# Patient Record
Sex: MALE | Race: WHITE
[De-identification: names, ages, dates, MRNs, and addresses within clinical notes are randomized per-mention and may not be internally consistent; named-entity substitution may affect disease eponyms.]

---

## 2019-02-27 ENCOUNTER — HOSPITAL ENCOUNTER (EMERGENCY)
Dept: HOSPITAL 11 - JP.ED | Age: 54
Discharge: HOME | End: 2019-02-27
Payer: MEDICAID

## 2019-02-27 VITALS — SYSTOLIC BLOOD PRESSURE: 149 MMHG | DIASTOLIC BLOOD PRESSURE: 73 MMHG

## 2019-02-27 DIAGNOSIS — K21.9: ICD-10-CM

## 2019-02-27 DIAGNOSIS — Z88.5: ICD-10-CM
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
## 2019-02-27 DIAGNOSIS — Z88.1: ICD-10-CM

## 2019-02-27 DIAGNOSIS — S82.842A: Primary | ICD-10-CM

## 2019-02-27 PROCEDURE — 73600 X-RAY EXAM OF ANKLE: CPT

## 2019-02-27 PROCEDURE — 27810 TREATMENT OF ANKLE FRACTURE: CPT

## 2019-02-27 PROCEDURE — 99152 MOD SED SAME PHYS/QHP 5/>YRS: CPT

## 2019-02-27 PROCEDURE — 73610 X-RAY EXAM OF ANKLE: CPT

## 2019-02-27 PROCEDURE — 99283 EMERGENCY DEPT VISIT LOW MDM: CPT

## 2019-02-27 NOTE — CRLCR
Indication:



Fracture post reduction



Technique:



Left ankle 3 views



Comparison:



February 27, 2019



Findings/Impression:



The ankle has been placed in a cast. Trimalleolar fracture fragments are in 

satisfactory alignment. Ankle mortise is congruent. No new abnormality



Dictated by Frankie Diaz MD @ 2/27/2019 6:31:28 PM



Dictated by: Frankie Diaz MD @ 02/27/2019 18:31:33



(Electronically Signed)

## 2019-02-27 NOTE — PCM.CONS
H&P History of Present Illness





- General


Date of Service: 02/27/19


Source of Information: Patient


History Limitations: Reports: No Limitations





- History of Present Illness


Initial Comments - Free Text/Narative: 





53 year old male presents to the ED with obvious deformity of the left ankle 

after a slip and fall on the ice. X-rays show a markedly displaced bimalleolar 

fracture. No other injuries.


Patient reports having last drink, beer, at approximately 3:30 PM.


Onset of Symptoms: Reports: Today, Sudden


Location: Reports: Lower Extremity, Left


Quality: Reports: Sharp


Severity: Severe


Associated Symptoms: Reports: No Other Symptoms


  ** Left Ankle


Pain Score (Numeric/FACES): 6





- Related Data


Allergies/Adverse Reactions: 


 Allergies











Allergy/AdvReac Type Severity Reaction Status Date / Time


 


azithromycin Allergy  Itching Verified 05/22/18 09:18


 


hydrocodone Allergy  Itching Verified 05/22/18 09:18


 


oxycodone Allergy  Itching Verified 05/22/18 09:18











Home Medications: 


 Home Meds





NK [No Known Home Meds]  12/29/15 [History]











Past Medical History


HEENT History: Reports: Hard of Hearing, Impaired Vision


Other HEENT History: wears contacts


Gastrointestinal History: Reports: GERD


Musculoskeletal History: Reports: Back Pain, Chronic





- Infectious Disease History


Infectious Disease History: Reports: Chicken Pox





- Past Surgical History


GI Surgical History: Reports: None


Musculoskeletal Surgical History: Reports: Other (See Below)





Social & Family History





- Family History


Family Medical History: Noncontributory





- Tobacco Use


Smoking Status *Q: Never Smoker





- Caffeine Use


Caffeine Use: Reports: Coffee, Soda





- Recreational Drug Use


Recreational Drug Use: No





H&P Review of Systems





- Review of Systems:


Review Of Systems: ROS reveals no pertinent complaints other than HPI.





Exam





- Exam


Exam: See Below





- Vital Signs


Vital Signs: 


 Last Vital Signs











Temp  35.7 C   02/27/19 17:30


 


Pulse  95   02/27/19 17:30


 


Resp  16   02/27/19 17:30


 


BP  149/73 H  02/27/19 17:30


 


Pulse Ox  94 L  02/27/19 17:30











Weight: 108.862 kg





- Exam


General: Alert, Oriented, 4


HEENT: PERRLA, Hearing Intact, Mucosa Moist & Pink, Nares Patent, Normal Nasal 

Septum, Posterior Pharynx Clear, Conjunctiva Clear, EOMI, EACs Clear, TMs Clear


Extremities: Other (Left ankle laterally displaced, toes cool but with good 

capillary refill, weak dorsalis pulse, skin intact)


Peripheral Pulses: 1+: Dorsalis Pedis (L), 2+: Dorsalis Pedis (R)


Neurological: Cranial Nerves Intact, Reflexes Equal Bilateral


Neuro Extensive - Mental Status: Alert, Oriented x3, Normal Mood/Affect, Normal 

Cognition


Neuro Extensive - Motor, Sensory, Reflexes: CN II-XII Intact, Normal Gait, 

Normal Reflexes


Psychiatric: Alert, Normal Affect, Normal Mood





Consult PN Assessment/Plan


Procedures: 


Procedures





DIAGNOSTIC COLONOSCOPY (12/31/15)


HOT OR COLD PACKS THERAPY (10/03/13)


MRI LUMBAR SPINE W/O & W/DYE (05/22/18)


OT EVALUATION (12/08/16)


THERAPEUTIC ACTIVITIES (12/08/16)


THERAPEUTIC EXERCISES (10/03/13)








(1) Bimalleolar fracture of left ankle


SNOMED Code(s): 289619267


   Code(s): S82.842A - DISPLACED BIMALLEOLAR FRACTURE OF LEFT LOWER LEG, INIT   

Current Visit: Yes   


Qualifiers: 


   Encounter type: initial encounter   Fracture type: closed   Qualified Code(s)

: S82.842A - Displaced bimalleolar fracture of left lower leg, initial 

encounter for closed fracture   


Assessment:: 


Displaced bimalleolar fracture with comminuted fibula.





Problem List Initiated/Reviewed/Updated: Yes


Plan: 


Left ankle reduced in ED with sedation provided by Dr. Loaiza.  Splint 

applied and post-reduction films obtained.  Good alignment post-reduction.


Will need surgical fixation.  Due to PO intake and alcohol would not be able to 

take to OR until after 11:30 PM. Can discharge to home.  Will arrange for 

surgery and be in contact with patient in the morning.  Instructed to keep foot 

elevated as much as possible and can use ice to minimize swelling.





Reason for Consult: Displaced fracture left ankle


Patient History Reviewed: Yes


Time Spent (in minutes): 30

## 2019-02-27 NOTE — CRLCR
Indication:



Injury 



Technique:



Three views left ankle



Comparison:



 None 



Findings/impression:



Highly comminuted fracture of the distal fibula. At least 2 cm of medial 

subluxation of the tibia in relation to the talus with obliquely oriented, 

minimally comminuted fracture of the medial malleolus. The talar dome 

appears intact. Diffuse soft tissue swelling around the ankle joint.



Dictated by Nidia Self MD @ Feb 27 2019  6:05PM



Signed by Dr. Nidia Self @ Feb 27 2019  6:07PM

## 2019-02-27 NOTE — PCM.OPNOTE
- General Post-Op/Procedure Note


Date of Surgery/Procedure: 02/27/19


Operative Procedure(s): Closed reduction left ankle


Findings: 





Markedly displaced bimalleolar fracture left ankle


Pre Op Diagnosis: Displaced bimalleolar fx left ankle


Post-Op Diagnosis: same


Anesthesia Technique: Moderate Sedation


Primary Surgeon: Herman Tavarez


Anesthesia Provider: Ronny Loaiza


Complications: None


Condition: Good


Free Text/Narrative:: 





After adequate sedation was obtained, gentle longitudinal traction was applied 

with a palpable reduction of the ankle.  Position was maintained while a well 

padded stirrup splint was applied.  Post-reduction x-rays were obtained to 

confirm adequate alignment.

## 2019-02-27 NOTE — EDM.PDOC
ED HPI GENERAL MEDICAL PROBLEM





- General


Chief Complaint: Lower Extremity Injury/Pain


Stated Complaint: POSSIBLE BROKEN ANKLE


Time Seen by Provider: 02/27/19 17:25


Source of Information: Reports: Patient, Family


History Limitations: Reports: No Limitations





- History of Present Illness


INITIAL COMMENTS - FREE TEXT/NARRATIVE: 





53-year-old male slipped on the ice injuring his left ankle. It's deformed, 

painful and is unable to bear weight. He can feel and move his toes.


Onset: Sudden


Duration: Hour(s): (Within the last hour)


Location: Reports: Lower Extremity, Left


Associated Symptoms: Reports: No Other Symptoms


  ** Left Ankle


Pain Score (Numeric/FACES): 6





- Related Data


 Allergies











Allergy/AdvReac Type Severity Reaction Status Date / Time


 


azithromycin Allergy  Itching Verified 02/28/19 15:23


 


hydrocodone Allergy  Itching Verified 02/28/19 15:23


 


oxycodone Allergy  Itching Verified 02/28/19 15:23











Home Meds: 


 Home Meds





Hydrocodone/Acetaminophen [Lorcet 5-325 mg Tablet] 1 each PO Q4H PRN 02/28/19 [

History]











Past Medical History


HEENT History: Reports: Hard of Hearing, Impaired Vision


Other HEENT History: wears contacts


Gastrointestinal History: Reports: GERD


Musculoskeletal History: Reports: Back Pain, Chronic





- Infectious Disease History


Infectious Disease History: Reports: Chicken Pox





- Past Surgical History


GI Surgical History: Reports: None


Musculoskeletal Surgical History: Reports: Other (See Below)





Social & Family History





- Family History


Family Medical History: Noncontributory





- Tobacco Use


Smoking Status *Q: Never Smoker





- Caffeine Use


Caffeine Use: Reports: Coffee, Soda





- Recreational Drug Use


Recreational Drug Use: No





Review of Systems





- Review of Systems


Review Of Systems: ROS reveals no pertinent complaints other than HPI.





ED EXAM, GENERAL





- Physical Exam


Exam: See Below


Exam Limited By: No Limitations


General Appearance: Alert, Mild Distress (Fairly uncomfortable)


Respiratory/Chest: No Respiratory Distress


Extremities: Other (Left ankle has obvious deformity with the talus shifted 

laterally and the medial malleolus very prominent, there is crepitus as well. 

Dorsalis pedis pulses very weak and the toes are somewhat cool to palpation.)





Course





- Vital Signs


Last Recorded V/S: 


 Last Vital Signs











Temp  96.2 F   02/27/19 17:30


 


Pulse  95   02/27/19 17:30


 


Resp  16   02/27/19 17:30


 


BP  149/73 H  02/27/19 17:30


 


Pulse Ox  94 L  02/27/19 17:30














- Orders/Labs/Meds


Meds: 


Medications














Discontinued Medications














Generic Name Dose Route Start Last Admin





  Trade Name [place]  PRN Reason Stop Dose Admin


 


Propofol  140 mg  02/27/19 17:44  02/27/19 18:42





  Diprivan  20 Ml  IVPUSH  02/27/19 17:45  140 mg





  ONETIME ONE   Administration





     





     





     





     














- Re-Assessments/Exams


Free Text/Narrative Re-Assessment/Exam: 


X-ray showed a comminuted bilateral malleoli fracture with talar displacement.


02/27/19 18:24


With the assistance of Dr. Putnam, 140 mg of IV propofol was used for general 

sedation. The ankle fracture was reduced to near anatomical position. An Ortho-

Glass splint was applied to the ankle, and he was supplied with 10 hydrocodone 

to supplement anti-inflammatories and will be contacted by the orthopedic 

Department tomorrow for a surgical time.





Post reduction x-ray shows near anatomical position.





Departure





- Departure


Time of Disposition: 18:44


Disposition: Home, Self-Care 01


Condition: Good


Clinical Impression: 


 Tibia/fibula fracture








- Discharge Information


Instructions:  Tibial Fracture, Adult, Easy-to-Read


Referrals: 


Brice Rivero MD [Primary Care Provider] - 


Forms:  ED Department Discharge


Care Plan Goals: 


Use crutches for ambulation, elevate foot when able. Ibuprofen or naproxen for 

pain and add stronger pain medication as needed and prescribed. You'll be 

contacted tomorrow for surgical time.

## 2019-02-28 ENCOUNTER — HOSPITAL ENCOUNTER (OUTPATIENT)
Dept: HOSPITAL 11 - JP.SDS | Age: 54
End: 2019-02-28
Attending: ORTHOPAEDIC SURGERY
Payer: MEDICAID

## 2019-02-28 VITALS — SYSTOLIC BLOOD PRESSURE: 148 MMHG | DIASTOLIC BLOOD PRESSURE: 80 MMHG
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
## 2019-02-28 DIAGNOSIS — K21.9: ICD-10-CM

## 2019-02-28 DIAGNOSIS — Z88.1: ICD-10-CM

## 2019-02-28 DIAGNOSIS — Z87.891: ICD-10-CM

## 2019-02-28 DIAGNOSIS — S82.842A: Primary | ICD-10-CM

## 2019-02-28 DIAGNOSIS — Z88.5: ICD-10-CM

## 2019-02-28 PROCEDURE — 36415 COLL VENOUS BLD VENIPUNCTURE: CPT

## 2019-02-28 PROCEDURE — 80053 COMPREHEN METABOLIC PANEL: CPT

## 2019-02-28 PROCEDURE — C1713 ANCHOR/SCREW BN/BN,TIS/BN: HCPCS

## 2019-02-28 PROCEDURE — 27814 TREATMENT OF ANKLE FRACTURE: CPT

## 2019-02-28 PROCEDURE — 85027 COMPLETE CBC AUTOMATED: CPT

## 2019-03-11 NOTE — PCM.OPNOTE
- General Post-Op/Procedure Note


Date of Surgery/Procedure: 02/28/19


Operative Procedure(s): Open Reduction and Internal Fixation Left Ankle


Findings: 





Comminuted bimalleolar fracture left ankle


Pre Op Diagnosis: Comminuted bimalleolar fracture left ankle


Post-Op Diagnosis: Same


Anesthesia Technique: General ET Tube


Primary Surgeon: Herman QUINTANA in mLs: 10


Complications: None


Condition: Good


Free Text/Narrative:: 





Indications: Giovanni is a 53-year-old male who slipped and fell resulting in 

injury to his left ankle. His ended to the emergency room with a markedly 

displaced bimalleolar fracture. The ankle was reduced in the emergency room. He 

now presents for open reduction and internal fixation of medial and lateral 

malleolus. Risks, benefits and potential complications were discussed. He 

agrees to proceed.





Procedure: After adequate anesthesia was obtained patient was placed supine 

with a bump under the left hip. The left foot and leg were prepped and draped 

in a sterile fashion. Leg was exsanguinated and tourniquet inflated to 250 mg 

of mercury pressure. Longitudinal incision was made over the lateral malleolus. 

This was taken down directly to the fibula. This revealed a markedly comminuted 

fracture of the distal fibular shaft. Bone clamps were used to bring the fibula 

out to length and reduce the smaller fragments. A Synthes contoured locking 

plate was then selected. This was secured distally with locking screws and 

proximally with 3.5 mm cortical screws. Distal fixation was obtained one of the 

main fragments with a 3.5 cortical screw. Final position of the fracture and 

fixation was evaluated with fluoroscopy.


This was then irrigated and kept moist. Attention was turned to the medial side 

of the ankle.





Incision was made over the medial malleolus. This was carried down through the 

subcutaneous tissue. The large medial malleolar fragment was identified. Soft 

tissues were cleared from the fracture. The fracture was then held in a reduced 

position with a bone clamp. 2 partially-threaded cancellous screws were then 

placed across the fracture. Good compression was obtained. Final position of 

the reduction and hardware was confirmed with fluoroscopy. This was irrigated. 

A flap of periosteum at the fracture site was then repaired over the fracture 

with Vicryl. Skin was closed with 2-0 Vicryl and 3-0 Monocryl. Steri-Strips 

were applied. The lateral incision was closed with 0 Vicryl in the deep layer 2-

0 Vicryl and 3-0 Monocryl on the skin. Structural applied. Incisions were 

injected with 0.5% Marcaine. A well padded the AO splint was then placed with 

the foot in neutral position. He tolerated procedure very well, there were no 

complications and was taken from the operating room in stable condition.

## 2019-06-17 ENCOUNTER — HOSPITAL ENCOUNTER (OUTPATIENT)
Dept: HOSPITAL 11 - JP.SDS | Age: 54
Discharge: HOME | End: 2019-06-17
Attending: ORTHOPAEDIC SURGERY
Payer: MEDICAID

## 2019-06-17 VITALS — SYSTOLIC BLOOD PRESSURE: 121 MMHG | DIASTOLIC BLOOD PRESSURE: 71 MMHG

## 2019-06-17 DIAGNOSIS — Z88.1: ICD-10-CM

## 2019-06-17 DIAGNOSIS — Z88.5: ICD-10-CM

## 2019-06-17 DIAGNOSIS — T84.84XA: ICD-10-CM

## 2019-06-17 DIAGNOSIS — M54.5: ICD-10-CM

## 2019-06-17 DIAGNOSIS — E66.9: ICD-10-CM

## 2019-06-17 DIAGNOSIS — Z79.899: ICD-10-CM

## 2019-06-17 DIAGNOSIS — K21.9: ICD-10-CM

## 2019-06-17 DIAGNOSIS — S82.52XG: Primary | ICD-10-CM

## 2019-06-17 DIAGNOSIS — I10: ICD-10-CM

## 2019-06-17 PROCEDURE — 80048 BASIC METABOLIC PNL TOTAL CA: CPT

## 2019-06-17 PROCEDURE — 85027 COMPLETE CBC AUTOMATED: CPT

## 2019-06-17 PROCEDURE — 36415 COLL VENOUS BLD VENIPUNCTURE: CPT

## 2019-06-17 PROCEDURE — 27724 REPAIR/GRAFT OF TIBIA: CPT

## 2019-06-17 PROCEDURE — 20680 REMOVAL OF IMPLANT DEEP: CPT

## 2019-06-17 PROCEDURE — 76000 FLUOROSCOPY <1 HR PHYS/QHP: CPT

## 2019-06-17 PROCEDURE — C1713 ANCHOR/SCREW BN/BN,TIS/BN: HCPCS

## 2019-06-18 NOTE — OR
DATE OF PROCEDURE:  06/17/2019

 

PREOPERATIVE DIAGNOSES:

1. Painful delayed union, left medial malleolus.

2. Painful hardware, left medial malleolus.

 

POSTOPERATIVE DIAGNOSES:

1. Painful delayed union, left medial malleolus.

2. Painful hardware, left medial malleolus.

 

PROCEDURES:

1. Removal of hardware, left ankle, 2 compression screws.

2. Bone graft delayed union.

3. Internal fixation of medial malleolus using compression screw and tension 
band wiring.

 

ANESTHESIA:  General.

 

INDICATIONS:  Mr. Gonzales is a 53-year-old gentleman who sustained a fracture and 
dislocation

of his left ankle and underwent open reduction and internal fixation of the 
fibula and

medial malleolus.  He has demonstrated delayed union, particularly of the 
medial malleolus

on x-ray.  He has pain over the hardware, minimal pain over the fibula.  He now 
presents for

removal of hardware, evaluation of the delayed union for potential bone 
grafting and

refixation.  Risks, benefits, and potential complications were discussed.

 

DESCRIPTION OF PROCEDURE:  After adequate anesthesia was obtained, the patient 
was placed

supine with a tourniquet about the left upper thigh.  Left leg was prepped and 
draped in a

sterile fashion.  Leg was exsanguinated and tourniquet inflated to 300 mmHg.  A 
previous

incision was utilized and carried slightly more proximally.  Dissection carried 
down over

the medial malleolus and the 2 previously placed screws were cleared of soft 
tissue.  These

had backed out slightly and did show some loosening.  These were removed without

difficulty.  The fracture site was identified.  This did show some persistent 
motion at the fracture line.

Fibrous tissue was cleared from the fracture site, which showed some healing in 
the

posterior aspect, but persistent fracture line anteriorly.  Fibrous tissue was 
cleared using

a combination of Murdock elevator and osteotome.

 

Dissection was then carried proximal to the fracture line and an osteotome was 
used to make

a small cortical window in the distal tibia.  Cancellous bone was then 
harvested from the

tibia and packed into the nonunion site.  The cortical window was then 
replaced.  A single 4.0

partially-threaded screw was then placed across the fracture line and confirmed 
using

fluoroscopy.  This was not completely tightened down initially.  Two drill 
holes were then

made above the fracture line and connected using a towel clip.  A 16-gauge wire 
was then

passed through the drill holes, crossed over the fracture site and under the 
head of the

screw.  This was tightened down compressing the fracture.  The screw was then 
tightened

down obtaining excellent fixation at the fracture.  Wire was cut and bent and 
buried into

the soft tissue.  Final position was

confirmed using fluoroscopy.  Wound was irrigated.  Soft tissues were then 
closed using 0

Vicryl in interrupted fashion, 2-0 Vicryl, and a running 3-0 Monocryl.  Steri-
Strips were

applied.  A well-padded AO splint was then placed.  The patient

tolerated the procedure very well.  There were no complications.  He was taken 
from the

operating room in stable condition.

 

 

 

 

Herman Tavarez MD

DD:  06/18/2019 08:09:42

DT:  06/18/2019 11:46:03

Job #:  [id number]

ALEXIA

## 2020-06-12 NOTE — OR
DATE OF PROCEDURE:  06/12/2020

 

SURGEON:  Teddy Rhodes MD

 

PROCEDURE:  Colonoscopy.

 

FINDINGS:  Normal colonoscopy.

 

COMPLICATIONS:  None.

 

ASSISTANT:  None.

 

PREOPERATIVE DIAGNOSIS:  Family history of colorectal cancer.

 

POSTOPERATIVE DIAGNOSIS:  Family history of colorectal cancer.

 

RISKS:  Risks, benefits, alternatives, and limitations including, but not limited to

infection, bleeding, and perforation were explained to the patient, who wished to proceed.

 

PROCEDURE IN DETAIL:  The patient was placed in a left lateral decubitus position.  Digital

rectal exam was performed without abnormality.  Scope was introduced and advanced

atraumatically to the ileocecal valve.

 

Scope was brought back from the ascending, transverse, descending colon, and retroflexed.

 

No evidence of old or new blood.  No masses.  No polyps.  No diverticulosis.  No

abnormalities on retroflexion.  The patient tolerated the procedure well.

 

 

 

 

Teddy Rhodes MD

DD:  06/12/2020 11:14:42

DT:  06/12/2020 11:50:06

Job #:  [id number]

## 2021-07-26 NOTE — OR
DATE OF PROCEDURE:  07/21/2021

 

SURGEON:  Herman Tavarez MD

 

PREOPERATIVE DIAGNOSES:

1. Painful hardware, left ankle.

2. Synovitis, left ankle.

 

POSTOPERATIVE DIAGNOSES:

1. Painful hardware, left ankle.

2. Synovitis with soft tissue impingement, left ankle.

 

PROCEDURES:

1. Arthroscopy, left ankle with limited synovectomy.

2. Removal of hardware, left ankle including plate and screws laterally, screw 
and tension

    band wire medially.

 

ASSISTANT:  PAUL Oconnor.

 

ANESTHESIA:  General.

 

INDICATIONS:  Giovanni is a 55-year-old gentleman who sustained an ankle fracture 
dislocation

and underwent open reduction and internal fixation of the ankle.  Went on to 
have difficulty

with healing of the medial malleolus and had this revised.  This eventually 
healed.  Had

been having increasing pain, particularly over the medial hardware recently.  
Also having

intermittent swelling and pain across the anterior ankle consistent with 
impingement.  He

now presents for removal of hardware and evaluation of the ankle for loose 
bodies and/or

impingement.  Risks, benefits, potential complications were discussed.

 

DESCRIPTION OF PROCEDURE:  After adequate anesthesia was obtained, a tourniquet 
was placed

about the left upper thigh.  Left leg was prepped and draped in a sterile 
fashion.  Traction

harness placed over the left foot and traction applied.  Ankle joint was 
injected with approximately 10 mL of

normal saline distending the joint and an anterolateral portal was established. 
The scope

was introduced.  This revealed impinging soft tissues over the anteromedial 
aspect and into

the medial gutter.  Anteromedial portal was established under direct 
visualization.  A

shaver was introduced and synovectomy performed.  This included over the 
anterior joint and

medial gutter.  No loose bodies were identified.  The articular surfaces of the 
talar dome

and distal tibia were intact.  The area of the previous medial malleolar 
fracture was

identified with some slight overgrowth of scar tissue noted.  This was debrided 
with a

shaver.  Bleeding was ablated with radiofrequency.  Scope was switched from the 
lateral to

the medial portal and further debridement continued across the anterolateral 
joint and into

the lateral gutter.  No loose bodies or other abnormalities were identified.  
The articular

cartilage was intact.  Scope was withdrawn and the ankle was drained.  Traction 
was removed.

 

Incision was made over the medial malleolus using the previous incision, carried
down to the

level of the tension band wire.  This was cut next to the twisting knot.  It was
traced up

around the curve and cut again and removed in pieces.  The head of the medial 
screw was

identified and cleared of soft tissue and removed.  Attention then turned to the
lateral

side where previous incision was utilized.  This was carried down to the plate. 
The

proximal screws were removed with one of the screws being broken.  The screw 
head was

removed and the broken piece was left embedded in the shaft.  Distal locking 
screws were

removed.  Plate was then removed.  A rongeur was used to smooth over 
prominences.  Wound was

irrigated.  This was then closed with 0 Vicryl in the deep layer, 2-0 Vicryl and
a running 3-

0 Monocryl.  Port sites were closed with 3-0 Monocryl and Steri-Strips were 
applied.

Incisions were infiltrated with 0.5% Marcaine.  Sterile dressing was applied 
with a light

compressive wrap.  The patient tolerated procedure well.  There were no 
complications.

Taken from the operating room in stable condition.

 

 

 

 

Herman Tavarez MD

DD:  07/26/2021 12:19:41

DT:  07/26/2021 19:02:37

Job #:  [id number]

ALEXIA